# Patient Record
Sex: FEMALE | Race: WHITE | HISPANIC OR LATINO
[De-identification: names, ages, dates, MRNs, and addresses within clinical notes are randomized per-mention and may not be internally consistent; named-entity substitution may affect disease eponyms.]

---

## 2021-03-09 ENCOUNTER — APPOINTMENT (OUTPATIENT)
Dept: ORTHOPEDIC SURGERY | Facility: CLINIC | Age: 71
End: 2021-03-09
Payer: MEDICARE

## 2021-03-09 VITALS — BODY MASS INDEX: 24.11 KG/M2 | RESPIRATION RATE: 16 BRPM | WEIGHT: 150 LBS | HEIGHT: 66 IN

## 2021-03-09 DIAGNOSIS — Z86.79 PERSONAL HISTORY OF OTHER DISEASES OF THE CIRCULATORY SYSTEM: ICD-10-CM

## 2021-03-09 DIAGNOSIS — M25.641 STIFFNESS OF RIGHT HAND, NOT ELSEWHERE CLASSIFIED: ICD-10-CM

## 2021-03-09 DIAGNOSIS — Z86.39 PERSONAL HISTORY OF OTHER ENDOCRINE, NUTRITIONAL AND METABOLIC DISEASE: ICD-10-CM

## 2021-03-09 DIAGNOSIS — Z00.00 ENCOUNTER FOR GENERAL ADULT MEDICAL EXAMINATION W/OUT ABNORMAL FINDINGS: ICD-10-CM

## 2021-03-09 DIAGNOSIS — S69.91XA UNSPECIFIED INJURY OF RIGHT WRIST, HAND AND FINGER(S), INITIAL ENCOUNTER: ICD-10-CM

## 2021-03-09 DIAGNOSIS — M25.631 STIFFNESS OF RIGHT WRIST, NOT ELSEWHERE CLASSIFIED: ICD-10-CM

## 2021-03-09 DIAGNOSIS — S52.571P OTHER INTRAARTICULAR FRACTURE OF LOWER END OF RIGHT RADIUS, SUBSEQUENT ENCOUNTER FOR CLOSED FRACTURE WITH MALUNION: ICD-10-CM

## 2021-03-09 PROCEDURE — 73110 X-RAY EXAM OF WRIST: CPT | Mod: RT

## 2021-03-09 PROCEDURE — 99204 OFFICE O/P NEW MOD 45 MIN: CPT

## 2021-03-09 PROCEDURE — 99072 ADDL SUPL MATRL&STAF TM PHE: CPT

## 2021-05-11 ENCOUNTER — APPOINTMENT (OUTPATIENT)
Dept: ENDOCRINOLOGY | Facility: CLINIC | Age: 71
End: 2021-05-11
Payer: MEDICARE

## 2021-05-11 VITALS
WEIGHT: 171 LBS | SYSTOLIC BLOOD PRESSURE: 118 MMHG | DIASTOLIC BLOOD PRESSURE: 60 MMHG | BODY MASS INDEX: 27.48 KG/M2 | HEIGHT: 66 IN | HEART RATE: 64 BPM

## 2021-05-11 PROCEDURE — 99205 OFFICE O/P NEW HI 60 MIN: CPT

## 2021-05-11 PROCEDURE — 99072 ADDL SUPL MATRL&STAF TM PHE: CPT

## 2021-05-13 NOTE — CONSULT LETTER
[Dear  ___] : Dear  [unfilled], [Consult Letter:] : I had the pleasure of evaluating your patient, [unfilled]. [Sincerely,] : Sincerely, [FreeTextEntry3] : Aston Dominique

## 2021-05-13 NOTE — REVIEW OF SYSTEMS
[As Noted in HPI] : as noted in HPI [Negative] : Heme/Lymph [Recent Weight Gain (___ Lbs)] : no recent weight gain [Recent Weight Loss (___ Lbs)] : no recent weight loss [Nocturia] : no nocturia [de-identified] : Denies pins and needles sensation in LE
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
## 2021-05-13 NOTE — ADDENDUM
[FreeTextEntry1] : I, Larua Marks, acted solely as a scribe for Dr. Aston Dominique on this date. 05/11/2021.

## 2021-05-13 NOTE — ASSESSMENT
[Importance of Diet and Exercise] : importance of diet and exercise to improve glycemic control, achieve weight loss and improve cardiovascular health [Self Monitoring of Blood Glucose] : self monitoring of blood glucose [FreeTextEntry1] : 69 y/o F pt with:\par \par 1. T2DM (dx approx 10 yrs ago), no known DM related complications: \par Pt dx with gestational DM 44 years ago.   \par Diabetes treatment goals discussed, including target goals for BP, LDL-c, A1c, and body weight. \par Discussed the importance of BS monitoring, along with targets for pre and post prandial BS. Briefly summarized anti-diabetic medications, including benefits and side effects\par Emphasized the importance of preventions of DM complication\par Recommend pt check BS readings (before and after meals) and continue present DM management, until the assessment of current evaluation is completed. \par Refer pt to see RD and nurse educator for comprehensive DM teaching, including sick days management. \par Pt will get finger stick blood sugar test and labs completed. \par \par 2. Thyroid enlarged\par Clinically euthyroid\par Send for Thyroid US. \par \par Return in 2 months. \par

## 2021-05-13 NOTE — PHYSICAL EXAM
[Alert] : alert [Normal Sclera/Conjunctiva] : normal sclera/conjunctiva [Normal Outer Ear/Nose] : the ears and nose were normal in appearance [No Respiratory Distress] : no respiratory distress [Normal S1, S2] : normal S1 and S2 [Normal Rate] : heart rate was normal [No Edema] : no peripheral edema [Normal Bowel Sounds] : normal bowel sounds [Spine Straight] : spine straight [Normal Gait] : normal gait [No Rash] : no rash [Right Foot Was Examined] : right foot ~C was examined [Left Foot Was Examined] : left foot ~C was examined [2+] : 2+ in the dorsalis pedis [Normal Reflexes] : deep tendon reflexes were 2+ and symmetric [Oriented x3] : oriented to person, place, and time [de-identified] : Thyroid lobes palpated

## 2021-05-13 NOTE — END OF VISIT
[FreeTextEntry3] : All medical record entries made by the Scribe were at my, Dr. Aston Dominique, direction and personally dictated by me on 05/11/2021. I have reviewed the chart and agree that the record accurately reflects my personal performance of the history, physical exam, assessment and plan. I have also personally directed, reviewed and agreed with the chart.  [Time Spent: ___ minutes] : I have spent [unfilled] minutes of time on the encounter.

## 2021-05-13 NOTE — REASON FOR VISIT
[Initial Evaluation] : an initial evaluation [DM Type 2] : DM Type 2 [FreeTextEntry2] : Dr. Tavo Walters

## 2021-06-10 ENCOUNTER — APPOINTMENT (OUTPATIENT)
Dept: ENDOCRINOLOGY | Facility: CLINIC | Age: 71
End: 2021-06-10
Payer: MEDICARE

## 2021-06-10 VITALS
BODY MASS INDEX: 27.6 KG/M2 | SYSTOLIC BLOOD PRESSURE: 144 MMHG | WEIGHT: 171 LBS | HEART RATE: 67 BPM | DIASTOLIC BLOOD PRESSURE: 80 MMHG

## 2021-06-10 PROCEDURE — 82962 GLUCOSE BLOOD TEST: CPT

## 2021-06-10 PROCEDURE — 99214 OFFICE O/P EST MOD 30 MIN: CPT | Mod: 25

## 2021-06-10 NOTE — REASON FOR VISIT
[Follow - Up] : a follow-up visit [DM Type 2] : DM Type 2 [Thyroid nodule/ MNG] : thyroid nodule/ MNG [Spouse] : spouse

## 2021-06-11 NOTE — REVIEW OF SYSTEMS
[As Noted in HPI] : as noted in HPI [Negative] : Heme/Lymph [Recent Weight Gain (___ Lbs)] : no recent weight gain [Recent Weight Loss (___ Lbs)] : no recent weight loss [Nocturia] : no nocturia [de-identified] : no pins and needles sensation in LE

## 2021-06-11 NOTE — END OF VISIT
[FreeTextEntry3] : All medical record entries made by the Scribe were at my, Dr. Aston Dominique, direction and personally dictated by me on 06/10/2021. I have reviewed the chart and agree that the record accurately reflects my personal performance of the history, physical exam, assessment and plan. I have also personally directed, reviewed and agreed with the chart.

## 2021-06-11 NOTE — ADDENDUM
[FreeTextEntry1] : I, Shreya Norman, acted solely as a scribe for Dr. Aston Dominique on this date. 06/10/2021.

## 2021-06-11 NOTE — PHYSICAL EXAM
[Alert] : alert [Normal Sclera/Conjunctiva] : normal sclera/conjunctiva [Normal Outer Ear/Nose] : the ears and nose were normal in appearance [No Respiratory Distress] : no respiratory distress [Normal S1, S2] : normal S1 and S2 [Normal Rate] : heart rate was normal [No Edema] : no peripheral edema [Normal Bowel Sounds] : normal bowel sounds [Spine Straight] : spine straight [Normal Gait] : normal gait [No Rash] : no rash [Right Foot Was Examined] : right foot ~C was examined [Left Foot Was Examined] : left foot ~C was examined [2+] : 2+ in the dorsalis pedis [Normal Reflexes] : deep tendon reflexes were 2+ and symmetric [Oriented x3] : oriented to person, place, and time [de-identified] : Thyroid lobes palpated

## 2021-06-11 NOTE — ASSESSMENT
[FreeTextEntry1] : 69 y/o F pt with:\par \par 1. T2DM (dx ~10 yrs ago) with no known DM related complications: \par Her glycemia level are in good range. Her A1c is 6.2%. Recommend to continue with Metformin. \par \par 2. HLD:\par Pt is at high risk for ASCVD. 10 yrs ASCVD risk: 24.09%. It is recommended to take high intensity statin therapy. Therefore, will increase Lipitor to 40 mg qd. Benefit and side effects explained. \par Continue with Zetia. \par Advised pt to decrease fat consumption. \par \par 3. Thyroid nodules:\par Thyroid US on 5/12/21 reveals L lobe with lower pole 1.6 x 1.2 x 1.5 cm hypoechoic solid nodule that fulfills criteria for FNA biopsy. \par Refer pt to [facility] for FNA biopsy.\par \par Return in 1 month. \par
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
## 2021-06-22 ENCOUNTER — RESULT REVIEW (OUTPATIENT)
Age: 71
End: 2021-06-22

## 2021-06-22 ENCOUNTER — APPOINTMENT (OUTPATIENT)
Dept: ULTRASOUND IMAGING | Facility: CLINIC | Age: 71
End: 2021-06-22
Payer: MEDICARE

## 2021-06-22 ENCOUNTER — OUTPATIENT (OUTPATIENT)
Dept: OUTPATIENT SERVICES | Facility: HOSPITAL | Age: 71
LOS: 1 days | End: 2021-06-22

## 2021-06-22 PROCEDURE — 10005 FNA BX W/US GDN 1ST LES: CPT

## 2021-06-22 PROCEDURE — 88173 CYTOPATH EVAL FNA REPORT: CPT | Mod: 26

## 2021-06-22 PROCEDURE — 88305 TISSUE EXAM BY PATHOLOGIST: CPT | Mod: 26

## 2021-06-25 LAB — NON-GYNECOLOGICAL CYTOLOGY STUDY: SIGNIFICANT CHANGE UP

## 2021-07-15 ENCOUNTER — APPOINTMENT (OUTPATIENT)
Dept: ENDOCRINOLOGY | Facility: CLINIC | Age: 71
End: 2021-07-15
Payer: MEDICARE

## 2021-07-15 VITALS
HEIGHT: 66 IN | SYSTOLIC BLOOD PRESSURE: 136 MMHG | DIASTOLIC BLOOD PRESSURE: 68 MMHG | HEART RATE: 65 BPM | WEIGHT: 166 LBS | BODY MASS INDEX: 26.68 KG/M2

## 2021-07-15 LAB
GLUCOSE BLDC GLUCOMTR-MCNC: 109
GLUCOSE BLDC GLUCOMTR-MCNC: 114

## 2021-07-15 PROCEDURE — 99214 OFFICE O/P EST MOD 30 MIN: CPT | Mod: 25

## 2021-07-15 PROCEDURE — 82962 GLUCOSE BLOOD TEST: CPT

## 2021-07-15 NOTE — ASSESSMENT
[Carbohydrate Consistent Diet] : carbohydrate consistent diet [Importance of Diet and Exercise] : importance of diet and exercise to improve glycemic control, achieve weight loss and improve cardiovascular health [Self Monitoring of Blood Glucose] : self monitoring of blood glucose [FreeTextEntry1] : 69 y/o F pt with:\par \par 1. T2DM (dx ~10 yrs ago) with no known DM related complications: \par She is up-to-date with her ophthalmologist and internist visits. Her A1c is in target. She is doing well. \par Continue with Metformin 500 mg bid. \par \par 2. Thyroid nodules:\par FNA biopsy  L lower pole 1.6 cm nodule on 6/22/21: [place] II\par Continue monitoring the thyroid nodule growth rate. \par \par 3.  HLD with increased risk for ASCVD:\par LDL-c of 120 in 5/2021.\par Will continue optimizing her treatment. In addition to decreasing fat consumption, will increase Lipitor to medium intensity- 40 mg. Continue with Zetia. \par Target LDL-c of 70. \par \par Return in 4 months\par

## 2021-07-15 NOTE — REVIEW OF SYSTEMS
[Recent Weight Loss (___ Lbs)] : recent weight loss: [unfilled] lbs [Negative] : Heme/Lymph [Chest Pain] : no chest pain [Shortness Of Breath] : no shortness of breath [Polyuria] : no polyuria [Nocturia] : no nocturia [Polydipsia] : no polydipsia

## 2021-07-15 NOTE — PHYSICAL EXAM
[Alert] : alert [Normal Sclera/Conjunctiva] : normal sclera/conjunctiva [Normal Outer Ear/Nose] : the ears and nose were normal in appearance [No Respiratory Distress] : no respiratory distress [Normal S1, S2] : normal S1 and S2 [Normal Rate] : heart rate was normal [No Edema] : no peripheral edema [Normal Bowel Sounds] : normal bowel sounds [Spine Straight] : spine straight [Normal Gait] : normal gait [No Rash] : no rash [Right Foot Was Examined] : right foot ~C was examined [Left Foot Was Examined] : left foot ~C was examined [2+] : 2+ in the dorsalis pedis [Normal Reflexes] : deep tendon reflexes were 2+ and symmetric [Oriented x3] : oriented to person, place, and time [de-identified] : R thyroid lobe with nodularity palpated

## 2021-07-15 NOTE — CONSULT LETTER
[Dear  ___] : Dear  [unfilled], [Courtesy Letter:] : I had the pleasure of seeing your patient, [unfilled], in my office today. [Sincerely,] : Sincerely, [FreeTextEntry3] : Aston Dominique
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
## 2021-07-15 NOTE — END OF VISIT
[Time Spent: ___ minutes] : I have spent [unfilled] minutes of time on the encounter. [FreeTextEntry3] : All medical record entries made by the Scribe were at my, Dr. Aston Dominique, direction and personally dictated by me on 07/15/2021. I have reviewed the chart and agree that the record accurately reflects my personal performance of the history, physical exam, assessment and plan. I have also personally directed, reviewed and agreed with the chart.

## 2021-07-16 ENCOUNTER — APPOINTMENT (OUTPATIENT)
Dept: ENDOCRINOLOGY | Facility: CLINIC | Age: 71
End: 2021-07-16

## 2021-11-08 ENCOUNTER — APPOINTMENT (OUTPATIENT)
Dept: ENDOCRINOLOGY | Facility: CLINIC | Age: 71
End: 2021-11-08
Payer: MEDICARE

## 2021-11-08 VITALS
WEIGHT: 165 LBS | HEIGHT: 66 IN | DIASTOLIC BLOOD PRESSURE: 74 MMHG | SYSTOLIC BLOOD PRESSURE: 142 MMHG | BODY MASS INDEX: 26.52 KG/M2 | HEART RATE: 70 BPM

## 2021-11-08 LAB — GLUCOSE BLDC GLUCOMTR-MCNC: 103

## 2021-11-08 PROCEDURE — 82962 GLUCOSE BLOOD TEST: CPT

## 2021-11-08 PROCEDURE — 99214 OFFICE O/P EST MOD 30 MIN: CPT | Mod: 25

## 2021-11-08 NOTE — PHYSICAL EXAM
[Alert] : alert [Normal Sclera/Conjunctiva] : normal sclera/conjunctiva [Normal Outer Ear/Nose] : the ears and nose were normal in appearance [No Respiratory Distress] : no respiratory distress [Normal S1, S2] : normal S1 and S2 [Normal Rate] : heart rate was normal [No Edema] : no peripheral edema [Normal Bowel Sounds] : normal bowel sounds [Spine Straight] : spine straight [Normal Gait] : normal gait [No Rash] : no rash [2+] : 2+ in the dorsalis pedis [Normal Reflexes] : deep tendon reflexes were 2+ and symmetric [Oriented x3] : oriented to person, place, and time [de-identified] : L thyroid lobe with nodularity palpated [de-identified] : Right hand with brace

## 2021-11-08 NOTE — ASSESSMENT
[Carbohydrate Consistent Diet] : carbohydrate consistent diet [Importance of Diet and Exercise] : importance of diet and exercise to improve glycemic control, achieve weight loss and improve cardiovascular health [Self Monitoring of Blood Glucose] : self monitoring of blood glucose [FreeTextEntry1] : 72 y/o F pt with:\par \par 1. T2DM (dx ~10 yrs ago) with no known DM related complications:\par Patient is focused with her treatment and lifestyle. Most recent A1c of 6.1%.\par Continue with metformin.\par \par 2. L lower thyroid nodule:\par Asymptomatic\par Will order thyroid US for next summer.\par FNA biopsy  L lower pole 1.6 cm nodule on 6/22/21: [place] II\par Continue monitoring the thyroid nodule growth rate. \par \par 3.  HLD with increased risk for ASCVD:\par Her LDL-c continues to be above target. Statins have been recently raised to 40 mg, and patient is concerned for further increase in dose due to muscle discomfort.\par Patient will cut down caloric consumption and trans fat and saturated fat consumption.\par \par Return in 6 months.\par

## 2021-11-08 NOTE — ADDENDUM
[FreeTextEntry1] : All medical record entries made by the Scribe were at my, Dr. Aston Dominique, direction and personally dictated by me on 11/08/2021. I have reviewed the chart and agree that the record accurately reflects my personal performance of the history, physical exam, assessment and plan. I have also personally directed, reviewed, and agreed with the chart.

## 2021-11-08 NOTE — REVIEW OF SYSTEMS
[Negative] : Endocrine [Chest Pain] : no chest pain [Shortness Of Breath] : no shortness of breath [Polyuria] : no polyuria [Nocturia] : no nocturia [Polydipsia] : no polydipsia
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
## 2021-11-08 NOTE — END OF VISIT
[FreeTextEntry3] : Documented by Tc Hernandez acting as a scribe for Dr. Aston Dominique on 11/08/2021. [Time Spent: ___ minutes] : I have spent [unfilled] minutes of time on the encounter.

## 2022-07-13 ENCOUNTER — APPOINTMENT (OUTPATIENT)
Dept: ENDOCRINOLOGY | Facility: CLINIC | Age: 72
End: 2022-07-13

## 2022-07-14 ENCOUNTER — APPOINTMENT (OUTPATIENT)
Dept: ENDOCRINOLOGY | Facility: CLINIC | Age: 72
End: 2022-07-14

## 2022-07-14 VITALS
DIASTOLIC BLOOD PRESSURE: 76 MMHG | BODY MASS INDEX: 25.39 KG/M2 | HEIGHT: 66 IN | WEIGHT: 158 LBS | HEART RATE: 60 BPM | SYSTOLIC BLOOD PRESSURE: 132 MMHG

## 2022-07-14 LAB
GLUCOSE BLDC GLUCOMTR-MCNC: 106
HBA1C MFR BLD HPLC: 6

## 2022-07-14 PROCEDURE — 82962 GLUCOSE BLOOD TEST: CPT

## 2022-07-14 PROCEDURE — 99214 OFFICE O/P EST MOD 30 MIN: CPT | Mod: 25

## 2022-07-14 PROCEDURE — 83036 HEMOGLOBIN GLYCOSYLATED A1C: CPT | Mod: QW

## 2022-07-14 RX ORDER — METFORMIN HYDROCHLORIDE 625 MG/1
TABLET ORAL
Refills: 0 | Status: ACTIVE | COMMUNITY

## 2022-07-14 RX ORDER — EZETIMIBE 10 MG/1
TABLET ORAL
Refills: 0 | Status: COMPLETED | COMMUNITY
Start: 1900-01-01 | End: 2022-07-14

## 2022-07-14 NOTE — DATA REVIEWED
[FreeTextEntry1] : Labs:\par - 11/08/21: POCT glucose 103\par - 10/05/21: microalbumin/creatinine ratio 5, cholesterol 234, LDL-c 115, s.creat 0.65, A1c 6.1%\par - 07/15/21: POCT glucose 109\par - 05/12/21: A1c 6.2%, s.creat 0.69, Cholesterol 224, LDL-c 120\par \par Imaging:\par - 07/2021 DEXA scan, compared to DEXA scan from 2006: AP T-score -1.8, left femoral T-score -0.6, left hip T-score -0.5\par - 06/22/21 FNA L lower pole 1.6 cm nodule: [place] II\par - 05/12/21 Thyroid US (for L lower pole thyroid nodule): R lobe measures 4.5 x 1.6 x 1.7 cm, slightly heterogeneous without discrete nodule. L lobe (measuring 4.3 x 1.5 x 2.0 cm) has a hypoechoic solid nodule in the lower pole that measures 1.6 x 1.2 x 1.5 cm and requires an FNA. 2 tiny nodules that are hypoechoic are seen in the upper pole the largest of which measures 7 mm in size. Impression: FNA suggested for solid hypoechoic 1.6 cm lower pole L nodule.
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
## 2022-07-14 NOTE — END OF VISIT
[FreeTextEntry3] : All medical record entries made by the Scribe were at my, Dr. Aston Dominique, direction and personally dictated by me on 07/14/2022. I have reviewed the chart and agree that the record accurately reflects my personal performance of the history, physical exam, assessment and plan. I have also personally directed, reviewed and agreed with the chart.  [Time Spent: ___ minutes] : I have spent [unfilled] minutes of time on the encounter.

## 2022-07-14 NOTE — PHYSICAL EXAM
[Alert] : alert [Normal Sclera/Conjunctiva] : normal sclera/conjunctiva [Normal Outer Ear/Nose] : the ears and nose were normal in appearance [No Respiratory Distress] : no respiratory distress [Normal S1, S2] : normal S1 and S2 [Normal Rate] : heart rate was normal [No Edema] : no peripheral edema [Normal Bowel Sounds] : normal bowel sounds [Spine Straight] : spine straight [Normal Gait] : normal gait [No Rash] : no rash [2+] : 2+ in the dorsalis pedis [Normal Reflexes] : deep tendon reflexes were 2+ and symmetric [Oriented x3] : oriented to person, place, and time [Acanthosis Nigricans] : no acanthosis nigricans [de-identified] : L thyroid lobe with nodularity palpated, measuring 1.3, round and smooth. No LN enlargement.

## 2022-07-14 NOTE — ADDENDUM
[FreeTextEntry1] : I Aimee Shane act soley as a scribe for Dr. Aston Dominique on this date. 07/14/2022

## 2022-07-14 NOTE — ASSESSMENT
[FreeTextEntry1] : 72 y/o F pt with:\par \par 1. T2DM diagnosed ~ 12 yrs ago, with no known DM related complications:\par Pt is doing well with A1c 6.0% today. Recommend to continue Metformin 500 g bid. \par \par 2. L lower thyroid nodule noticed during an endocrine exam on 05/11/21:\par Pt denies obstructive symptoms. \par Order for Thyroid US in February 2023. \par \par 3.  HLD with 10- year ASCVD risk of 24.09:\par Pt has modified her diet and is on Atorvastatin 40 mg qd. \par \par Return in February 2023. \par  [Carbohydrate Consistent Diet] : carbohydrate consistent diet [Importance of Diet and Exercise] : importance of diet and exercise to improve glycemic control, achieve weight loss and improve cardiovascular health [Self Monitoring of Blood Glucose] : self monitoring of blood glucose

## 2023-01-27 ENCOUNTER — NON-APPOINTMENT (OUTPATIENT)
Age: 73
End: 2023-01-27

## 2023-02-02 ENCOUNTER — APPOINTMENT (OUTPATIENT)
Dept: ENDOCRINOLOGY | Facility: CLINIC | Age: 73
End: 2023-02-02
Payer: MEDICARE

## 2023-02-02 VITALS
DIASTOLIC BLOOD PRESSURE: 72 MMHG | BODY MASS INDEX: 24.91 KG/M2 | HEIGHT: 66 IN | HEART RATE: 68 BPM | SYSTOLIC BLOOD PRESSURE: 129 MMHG | WEIGHT: 155 LBS

## 2023-02-02 LAB — GLUCOSE BLDC GLUCOMTR-MCNC: 106

## 2023-02-02 PROCEDURE — 82962 GLUCOSE BLOOD TEST: CPT

## 2023-02-02 PROCEDURE — 83036 HEMOGLOBIN GLYCOSYLATED A1C: CPT | Mod: QW

## 2023-02-02 PROCEDURE — 99214 OFFICE O/P EST MOD 30 MIN: CPT | Mod: 25

## 2023-02-02 RX ORDER — ENALAPRIL MALEATE 5 MG/1
TABLET ORAL
Refills: 0 | Status: DISCONTINUED | COMMUNITY
End: 2023-02-02

## 2023-02-03 NOTE — DATA REVIEWED
[FreeTextEntry1] : Scanned Labs:\par - 10/05/21: microalbumin/creatinine ratio 5, cholesterol 234, LDL-c 115, s.creat 0.65, A1c 6.1%\par - 05/12/21: A1c 6.2%, s.creat 0.69, Cholesterol 224, LDL-c 120\par \par Scanned Imaging:\par - 12/21/22 Thyroid US. R lobe measures 5.4 x 1.6 x 2.0 cm. R lobe has 3 nodules. 1) R midpole cystic nodule measures 0.5 x 0.4 x 0.4 cm. 2) R lower pole isoechoic solid nodule measures 0.6 x 0.4 x 0.4 cm. 3) R upper pole cyst with internal echoes measures 0.3 x 0.2 x 0.3 cm. L lobe measures 4.3 x 1.6 x 1.8 cm. L lobe has 3 nodules. 1) L upper pole hypoechoic solid nodule measures 1.0 x 0.6 x 0.7 cm (0.7 x 0.6 x 0.6 cm before). 2) L midpole hypoechoic solid nodule measures 0.4 x 0.4 x 0.6 cm (no change). 3) L lower pole isoechoic/hypoechoic well-circumscribed solid nodule with vascularity measures 1.6 x 1.0 x 1.4 cm (1.6 x 1.2 x 1.5 cm before). Impression: 1) Bilateral thyroid lobe nodules, a 1.6 cm dominant nodule within the lower pole L thyroid lobe although remains stable. US guided FNA was recommended on the prior study which is again recommended. 2) Follow up in 1 year for the remaining nodules is also recommended.  \par - 07/2021 DEXA scan, compared to DEXA scan from 2006: AP T-score -1.8, left femoral T-score -0.6, left hip T-score -0.5\par - 06/22/21 FNA L lower pole 1.6 cm nodule: [place] II\par - 05/12/21 Thyroid US (for L lower pole thyroid nodule): R lobe measures 4.5 x 1.6 x 1.7 cm, slightly heterogeneous without discrete nodule. L lobe (measuring 4.3 x 1.5 x 2.0 cm) has a hypoechoic solid nodule in the lower pole that measures 1.6 x 1.2 x 1.5 cm and requires an FNA. 2 tiny nodules that are hypoechoic are seen in the upper pole the largest of which measures 7 mm in size. Impression: FNA suggested for solid hypoechoic 1.6 cm lower pole L nodule.

## 2023-02-03 NOTE — REVIEW OF SYSTEMS
[Negative] : Heme/Lymph [Recent Weight Loss (___ Lbs)] : recent weight loss: [unfilled] lbs [As Noted in HPI] : as noted in HPI [Joint Pain] : joint pain [Dysphagia] : no dysphagia [Dysphonia] : no dysphonia [Difficulty Breathing] : no dyspnea [Nocturia] : no nocturia [FreeTextEntry2] : She lost 3 lbs in 7 months.

## 2023-02-03 NOTE — ADDENDUM
[FreeTextEntry1] : I Corinamichelle Lynn act soley as a scribe for Dr. Aston Dominique on this date. 02/02/2023

## 2023-02-03 NOTE — PHYSICAL EXAM
[Alert] : alert [Normal Sclera/Conjunctiva] : normal sclera/conjunctiva [Normal Outer Ear/Nose] : the ears and nose were normal in appearance [No Respiratory Distress] : no respiratory distress [Normal S1, S2] : normal S1 and S2 [Normal Rate] : heart rate was normal [No Edema] : no peripheral edema [Normal Bowel Sounds] : normal bowel sounds [Normal Gait] : normal gait [No Rash] : no rash [2+] : 2+ in the dorsalis pedis [Normal Reflexes] : deep tendon reflexes were 2+ and symmetric [Oriented x3] : oriented to person, place, and time [Kyphosis] : kyphosis present [Acanthosis Nigricans] : no acanthosis nigricans [de-identified] : Bilateral thyroid lobes palpated.  [de-identified] : Mild kyphosis

## 2023-02-03 NOTE — END OF VISIT
[FreeTextEntry3] : All medical record entries made by the Scribe were at my, Dr. Aston Dominique, direction and personally dictated by me on 02/02/2023. I have reviewed the chart and agree that the record accurately reflects my personal performance of the history, physical exam, assessment and plan. I have also personally directed, reviewed and agreed with the chart.  [Time Spent: ___ minutes] : I have spent [unfilled] minutes of time on the encounter.
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
## 2023-02-10 LAB — HBA1C MFR BLD HPLC: 5.8

## 2023-08-07 ENCOUNTER — APPOINTMENT (OUTPATIENT)
Dept: ENDOCRINOLOGY | Facility: CLINIC | Age: 73
End: 2023-08-07
Payer: MEDICARE

## 2023-08-07 VITALS
HEART RATE: 64 BPM | WEIGHT: 157 LBS | SYSTOLIC BLOOD PRESSURE: 135 MMHG | BODY MASS INDEX: 25.34 KG/M2 | DIASTOLIC BLOOD PRESSURE: 75 MMHG

## 2023-08-07 PROCEDURE — 99213 OFFICE O/P EST LOW 20 MIN: CPT | Mod: 25

## 2023-08-07 PROCEDURE — 82962 GLUCOSE BLOOD TEST: CPT

## 2023-08-07 NOTE — REVIEW OF SYSTEMS
[Negative] : Heme/Lymph [As Noted in HPI] : as noted in HPI [Dysphagia] : no dysphagia [Difficulty Breathing] : no dyspnea [FreeTextEntry2] : difficulty sleeping  [FreeTextEntry4] : denies voice changes [de-identified] : denies hypoglycemic episodes

## 2023-08-07 NOTE — DATA REVIEWED
[FreeTextEntry1] : Scanned Labs:\par  - 10/05/21: microalbumin/creatinine ratio 5, cholesterol 234, LDL-c 115, s.creat 0.65, A1c 6.1%\par  - 05/12/21: A1c 6.2%, s.creat 0.69, Cholesterol 224, LDL-c 120\par  \par  Scanned Imaging:\par  - 12/21/22 Thyroid US. R lobe measures 5.4 x 1.6 x 2.0 cm. R lobe has 3 nodules. 1) R midpole cystic nodule measures 0.5 x 0.4 x 0.4 cm. 2) R lower pole isoechoic solid nodule measures 0.6 x 0.4 x 0.4 cm. 3) R upper pole cyst with internal echoes measures 0.3 x 0.2 x 0.3 cm. L lobe measures 4.3 x 1.6 x 1.8 cm. L lobe has 3 nodules. 1) L upper pole hypoechoic solid nodule measures 1.0 x 0.6 x 0.7 cm (0.7 x 0.6 x 0.6 cm before). 2) L midpole hypoechoic solid nodule measures 0.4 x 0.4 x 0.6 cm (no change). 3) L lower pole isoechoic/hypoechoic well-circumscribed solid nodule with vascularity measures 1.6 x 1.0 x 1.4 cm (1.6 x 1.2 x 1.5 cm before). Impression: 1) Bilateral thyroid lobe nodules, a 1.6 cm dominant nodule within the lower pole L thyroid lobe although remains stable. US guided FNA was recommended on the prior study which is again recommended. 2) Follow up in 1 year for the remaining nodules is also recommended.  \par  - 07/2021 DEXA scan, compared to DEXA scan from 2006: AP T-score -1.8, left femoral T-score -0.6, left hip T-score -0.5\par  - 06/22/21 FNA L lower pole 1.6 cm nodule: [place] II\par  - 05/12/21 Thyroid US (for L lower pole thyroid nodule): R lobe measures 4.5 x 1.6 x 1.7 cm, slightly heterogeneous without discrete nodule. L lobe (measuring 4.3 x 1.5 x 2.0 cm) has a hypoechoic solid nodule in the lower pole that measures 1.6 x 1.2 x 1.5 cm and requires an FNA. 2 tiny nodules that are hypoechoic are seen in the upper pole the largest of which measures 7 mm in size. Impression: FNA suggested for solid hypoechoic 1.6 cm lower pole L nodule.

## 2023-08-07 NOTE — PHYSICAL EXAM
[No Acute Distress] : no acute distress [Normal Sclera/Conjunctiva] : normal sclera/conjunctiva [Normal Outer Ear/Nose] : the ears and nose were normal in appearance [Clear to Auscultation] : lungs were clear to auscultation bilaterally [Normal Rate] : heart rate was normal [Regular Rhythm] : with a regular rhythm [No Edema] : no peripheral edema [Pedal Pulses Normal] : the pedal pulses are present [Soft] : abdomen soft [Kyphosis] : kyphosis present [No Stigmata of Cushings Syndrome] : no stigmata of Cushings Syndrome [Normal Gait] : normal gait [Normal Strength/Tone] : muscle strength and tone were normal [No Rash] : no rash [2+] : 2+ in the dorsalis pedis [Normal Reflexes] : deep tendon reflexes were 2+ and symmetric [Oriented x3] : oriented to person, place, and time [Acanthosis Nigricans] : no acanthosis nigricans [de-identified] : Small thyroid nodules palpated bilaterally.  [de-identified] : Mild kyphosis

## 2023-08-07 NOTE — ADDENDUM
[FreeTextEntry1] : I, Audrey Calzada, act soley as a scribe for Dr. Aston Dominique on this date. 08/07/2023
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
## 2023-08-07 NOTE — END OF VISIT
[FreeTextEntry3] : All medical record entries made by the Scribe were at my, Dr. Aston Dominique, direction and personally dictated by me on 08/07/2023. I have reviewed the chart and agree that the record accurately reflects my personal performance of the history, physical exam, assessment and plan. I have also personally directed, reviewed and agreed with the chart.  [Time Spent: ___ minutes] : I have spent [unfilled] minutes of time on the encounter.

## 2023-11-06 ENCOUNTER — APPOINTMENT (OUTPATIENT)
Dept: ULTRASOUND IMAGING | Facility: HOSPITAL | Age: 73
End: 2023-11-06
Payer: MEDICARE

## 2023-11-06 ENCOUNTER — OUTPATIENT (OUTPATIENT)
Dept: OUTPATIENT SERVICES | Facility: HOSPITAL | Age: 73
LOS: 1 days | End: 2023-11-06
Payer: MEDICARE

## 2023-11-06 PROCEDURE — 76536 US EXAM OF HEAD AND NECK: CPT | Mod: 26

## 2023-11-06 PROCEDURE — 76536 US EXAM OF HEAD AND NECK: CPT

## 2023-12-23 LAB — GLUCOSE BLDC GLUCOMTR-MCNC: 107

## 2024-01-02 ENCOUNTER — APPOINTMENT (OUTPATIENT)
Dept: ENDOCRINOLOGY | Facility: CLINIC | Age: 74
End: 2024-01-02
Payer: MEDICARE

## 2024-01-02 VITALS
SYSTOLIC BLOOD PRESSURE: 171 MMHG | BODY MASS INDEX: 25.55 KG/M2 | DIASTOLIC BLOOD PRESSURE: 75 MMHG | WEIGHT: 159 LBS | HEART RATE: 67 BPM | HEIGHT: 66 IN

## 2024-01-02 DIAGNOSIS — M85.88 OTHER SPECIFIED DISORDERS OF BONE DENSITY AND STRUCTURE, OTHER SITE: ICD-10-CM

## 2024-01-02 DIAGNOSIS — E78.00 PURE HYPERCHOLESTEROLEMIA, UNSPECIFIED: ICD-10-CM

## 2024-01-02 PROCEDURE — 82962 GLUCOSE BLOOD TEST: CPT

## 2024-01-02 PROCEDURE — 99215 OFFICE O/P EST HI 40 MIN: CPT | Mod: 25

## 2024-01-07 LAB — GLUCOSE BLDC GLUCOMTR-MCNC: 105

## 2024-01-07 NOTE — REVIEW OF SYSTEMS
[As Noted in HPI] : as noted in HPI [Negative] : Heme/Lymph [Dysphagia] : no dysphagia [Difficulty Breathing] : no dyspnea [FreeTextEntry2] : difficulty sleeping  [FreeTextEntry4] : denies voice changes [de-identified] : denies hypoglycemic episodes

## 2024-01-07 NOTE — ADDENDUM
[FreeTextEntry1] : I, Yen Griffith, acted solely as a scribe for Dr. Aston Dominique on this date 01/02/2024

## 2024-01-07 NOTE — PHYSICAL EXAM
[No Acute Distress] : no acute distress [Normal Sclera/Conjunctiva] : normal sclera/conjunctiva [Normal Outer Ear/Nose] : the ears and nose were normal in appearance [Clear to Auscultation] : lungs were clear to auscultation bilaterally [Normal Rate] : heart rate was normal [Regular Rhythm] : with a regular rhythm [No Edema] : no peripheral edema [Pedal Pulses Normal] : the pedal pulses are present [Soft] : abdomen soft [Kyphosis] : kyphosis present [No Stigmata of Cushings Syndrome] : no stigmata of Cushings Syndrome [Normal Gait] : normal gait [Normal Strength/Tone] : muscle strength and tone were normal [No Rash] : no rash [2+] : 2+ in the dorsalis pedis [Normal Reflexes] : deep tendon reflexes were 2+ and symmetric [Oriented x3] : oriented to person, place, and time [Acanthosis Nigricans] : no acanthosis nigricans [de-identified] : Palpated L nodule [de-identified] : Mild kyphosis

## 2024-01-07 NOTE — DATA REVIEWED
[FreeTextEntry1] : Scanned Labs:  - 12/04/23: LDL-c 119, A1c 5.9%, TSH 0.89.  - 07/31/23 A1c 5.9%, s.creat 0.66, LDL-c 110 - 10/05/21: microalbumin/creatinine ratio 5, cholesterol 234, LDL-c 115, s.creat 0.65, A1c 6.1% - 05/12/21: A1c 6.2%, s.creat 0.69, Cholesterol 224, LDL-c 120  Imaging: - 11/06/23: Thyroid US: 1) L lower pole measures 1.6 x 1.3 x 1.3 cm. 2) L upper pole measures 1.1 x 0.7 x 0.7 cm. 3) Isthmus midline nodule measures 0.3 cm. IMPRESSION: 1. Mildly enlarged thyroid gland with few nodules. Since June 22, 2021, unchanged previously biopsied left lower pole dominant nodule. - 12/21/22 Thyroid US. R lobe measures 5.4 x 1.6 x 2.0 cm. R lobe has 3 nodules. 1) R midpole cystic nodule measures 0.5 x 0.4 x 0.4 cm. 2) R lower pole isoechoic solid nodule measures 0.6 x 0.4 x 0.4 cm. 3) R upper pole cyst with internal echoes measures 0.3 x 0.2 x 0.3 cm. L lobe measures 4.3 x 1.6 x 1.8 cm. L lobe has 3 nodules. 1) L upper pole hypoechoic solid nodule measures 1.0 x 0.6 x 0.7 cm (0.7 x 0.6 x 0.6 cm before). 2) L midpole hypoechoic solid nodule measures 0.4 x 0.4 x 0.6 cm (no change). 3) L lower pole isoechoic/hypoechoic well-circumscribed solid nodule with vascularity measures 1.6 x 1.0 x 1.4 cm (1.6 x 1.2 x 1.5 cm before). Impression: 1) Bilateral thyroid lobe nodules, a 1.6 cm dominant nodule within the lower pole L thyroid lobe although remains stable. US guided FNA was recommended on the prior study which is again recommended. 2) Follow up in 1 year for the remaining nodules is also recommended.   - 07/2021 DEXA scan, compared to DEXA scan from 2006: AP T-score -1.8, left femoral T-score -0.6, left hip T-score -0.5 - 06/22/21 FNA L lower pole 1.6 cm nodule: [place] II - 05/12/21 Thyroid US (for L lower pole thyroid nodule): R lobe measures 4.5 x 1.6 x 1.7 cm, slightly heterogeneous without discrete nodule. L lobe (measuring 4.3 x 1.5 x 2.0 cm) has a hypoechoic solid nodule in the lower pole that measures 1.6 x 1.2 x 1.5 cm and requires an FNA. 2 tiny nodules that are hypoechoic are seen in the upper pole the largest of which measures 7 mm in size. Impression: FNA suggested for solid hypoechoic 1.6 cm lower pole L nodule.

## 2024-01-07 NOTE — END OF VISIT
[Time Spent: ___ minutes] : I have spent [unfilled] minutes of time on the encounter. [FreeTextEntry3] : All medical record entries made by the Scribe were at my, Dr. Aston Dominique, direction and personally dictated by me on 01/02/2024. I have reviewed the chart and agree that the record accurately reflects my personal performance of the history, physical exam, assessment and plan. I have also personally, directed, reviewed and agreed with the chart

## 2024-01-07 NOTE — RESULTS/DATA
[L1 - L4] : L1 - L4 [T-Score ___] : T-score: [unfilled] [Major Osteoporotic Fracture (%): ___] : Major Osteoporotic Fracture (%):[unfilled] [Hip Fracture (%):___] : Hip Fracture (%): [unfilled] [FreeTextEntry4] : 07/2021

## 2024-06-18 ENCOUNTER — OUTPATIENT (OUTPATIENT)
Dept: OUTPATIENT SERVICES | Facility: HOSPITAL | Age: 74
LOS: 1 days | End: 2024-06-18

## 2024-06-18 ENCOUNTER — APPOINTMENT (OUTPATIENT)
Dept: RADIOLOGY | Facility: HOSPITAL | Age: 74
End: 2024-06-18
Payer: MEDICARE

## 2024-06-18 PROCEDURE — 77080 DXA BONE DENSITY AXIAL: CPT

## 2024-06-18 PROCEDURE — 77080 DXA BONE DENSITY AXIAL: CPT | Mod: 26

## 2024-07-01 ENCOUNTER — APPOINTMENT (OUTPATIENT)
Dept: ENDOCRINOLOGY | Facility: CLINIC | Age: 74
End: 2024-07-01
Payer: MEDICARE

## 2024-07-01 VITALS
BODY MASS INDEX: 25.99 KG/M2 | DIASTOLIC BLOOD PRESSURE: 70 MMHG | HEART RATE: 62 BPM | WEIGHT: 161 LBS | SYSTOLIC BLOOD PRESSURE: 148 MMHG

## 2024-07-01 DIAGNOSIS — Z86.39 PERSONAL HISTORY OF OTHER ENDOCRINE, NUTRITIONAL AND METABOLIC DISEASE: ICD-10-CM

## 2024-07-01 DIAGNOSIS — E04.1 NONTOXIC SINGLE THYROID NODULE: ICD-10-CM

## 2024-07-01 DIAGNOSIS — E11.9 TYPE 2 DIABETES MELLITUS W/OUT COMPLICATIONS: ICD-10-CM

## 2024-07-01 LAB
GLUCOSE BLDC GLUCOMTR-MCNC: 106
HBA1C MFR BLD HPLC: 5.9

## 2024-07-01 PROCEDURE — 83036 HEMOGLOBIN GLYCOSYLATED A1C: CPT | Mod: QW

## 2024-07-01 PROCEDURE — 99214 OFFICE O/P EST MOD 30 MIN: CPT | Mod: 25

## 2024-07-01 PROCEDURE — 82962 GLUCOSE BLOOD TEST: CPT

## 2024-07-01 RX ORDER — METFORMIN HYDROCHLORIDE 500 MG/1
500 TABLET, COATED ORAL
Qty: 180 | Refills: 2 | Status: ACTIVE | COMMUNITY
Start: 2024-07-01 | End: 1900-01-01

## 2025-01-03 ENCOUNTER — LABORATORY RESULT (OUTPATIENT)
Age: 75
End: 2025-01-03

## 2025-01-03 ENCOUNTER — OUTPATIENT (OUTPATIENT)
Dept: OUTPATIENT SERVICES | Facility: HOSPITAL | Age: 75
LOS: 1 days | End: 2025-01-03

## 2025-01-03 ENCOUNTER — APPOINTMENT (OUTPATIENT)
Dept: ULTRASOUND IMAGING | Facility: HOSPITAL | Age: 75
End: 2025-01-03
Payer: MEDICARE

## 2025-01-03 PROCEDURE — 76536 US EXAM OF HEAD AND NECK: CPT | Mod: 26

## 2025-01-03 PROCEDURE — 76536 US EXAM OF HEAD AND NECK: CPT

## 2025-01-06 ENCOUNTER — APPOINTMENT (OUTPATIENT)
Dept: ULTRASOUND IMAGING | Facility: HOSPITAL | Age: 75
End: 2025-01-06

## 2025-01-13 ENCOUNTER — APPOINTMENT (OUTPATIENT)
Dept: ENDOCRINOLOGY | Facility: CLINIC | Age: 75
End: 2025-01-13
Payer: MEDICARE

## 2025-01-13 VITALS
HEIGHT: 66 IN | HEART RATE: 70 BPM | WEIGHT: 149 LBS | BODY MASS INDEX: 23.95 KG/M2 | DIASTOLIC BLOOD PRESSURE: 69 MMHG | SYSTOLIC BLOOD PRESSURE: 140 MMHG

## 2025-01-13 DIAGNOSIS — E04.1 NONTOXIC SINGLE THYROID NODULE: ICD-10-CM

## 2025-01-13 DIAGNOSIS — M85.88 OTHER SPECIFIED DISORDERS OF BONE DENSITY AND STRUCTURE, OTHER SITE: ICD-10-CM

## 2025-01-13 DIAGNOSIS — E11.9 TYPE 2 DIABETES MELLITUS W/OUT COMPLICATIONS: ICD-10-CM

## 2025-01-13 LAB — GLUCOSE BLDC GLUCOMTR-MCNC: 113

## 2025-01-13 PROCEDURE — 99214 OFFICE O/P EST MOD 30 MIN: CPT | Mod: 25

## 2025-01-13 PROCEDURE — 82962 GLUCOSE BLOOD TEST: CPT
